# Patient Record
Sex: FEMALE | Race: WHITE
[De-identification: names, ages, dates, MRNs, and addresses within clinical notes are randomized per-mention and may not be internally consistent; named-entity substitution may affect disease eponyms.]

---

## 2023-07-26 ENCOUNTER — HOSPITAL ENCOUNTER (EMERGENCY)
Dept: HOSPITAL 26 - MED | Age: 18
Discharge: HOME | End: 2023-07-26
Payer: MEDICAID

## 2023-07-26 VITALS
OXYGEN SATURATION: 98 % | RESPIRATION RATE: 17 BRPM | DIASTOLIC BLOOD PRESSURE: 70 MMHG | HEART RATE: 74 BPM | SYSTOLIC BLOOD PRESSURE: 115 MMHG

## 2023-07-26 VITALS
RESPIRATION RATE: 18 BRPM | OXYGEN SATURATION: 98 % | TEMPERATURE: 97.2 F | SYSTOLIC BLOOD PRESSURE: 108 MMHG | DIASTOLIC BLOOD PRESSURE: 63 MMHG | HEART RATE: 81 BPM

## 2023-07-26 VITALS — HEIGHT: 61.5 IN | BODY MASS INDEX: 21.25 KG/M2 | WEIGHT: 114 LBS

## 2023-07-26 DIAGNOSIS — O23.91: ICD-10-CM

## 2023-07-26 DIAGNOSIS — R82.71: ICD-10-CM

## 2023-07-26 DIAGNOSIS — Z79.2: ICD-10-CM

## 2023-07-26 DIAGNOSIS — Z3A.01: ICD-10-CM

## 2023-07-26 DIAGNOSIS — O02.1: Primary | ICD-10-CM

## 2023-07-26 LAB
APPEARANCE UR: CLEAR
BASOPHILS # BLD AUTO: 0 K/UL (ref 0–0.22)
BASOPHILS NFR BLD AUTO: 0.5 % (ref 0–2)
BILIRUB UR QL STRIP: NEGATIVE
COLOR UR: YELLOW
EOSINOPHIL # BLD AUTO: 0.1 K/UL (ref 0–0.4)
EOSINOPHIL NFR BLD AUTO: 0.8 % (ref 0–4)
ERYTHROCYTE [DISTWIDTH] IN BLOOD BY AUTOMATED COUNT: 13.1 % (ref 11.6–13.7)
GLUCOSE UR STRIP-MCNC: NEGATIVE MG/DL
HCT VFR BLD AUTO: 36.8 % (ref 36–48)
HGB BLD-MCNC: 12.5 G/DL (ref 12–16)
HGB UR QL STRIP: (no result)
LEUKOCYTE ESTERASE UR QL STRIP: NEGATIVE
LYMPHOCYTES # BLD AUTO: 1.6 K/UL (ref 2.5–16.5)
LYMPHOCYTES NFR BLD AUTO: 22.1 % (ref 20.5–51.1)
MCH RBC QN AUTO: 30 PG (ref 27–31)
MCHC RBC AUTO-ENTMCNC: 34 G/DL (ref 33–37)
MCV RBC AUTO: 87.9 FL (ref 80–94)
MONOCYTES # BLD AUTO: 0.5 K/UL (ref 0.8–1)
MONOCYTES NFR BLD AUTO: 7.2 % (ref 1.7–9.3)
NEUTROPHILS # BLD AUTO: 5.2 K/UL (ref 1.8–7.7)
NEUTROPHILS NFR BLD AUTO: 69.4 % (ref 42.2–75.2)
NITRITE UR QL STRIP: NEGATIVE
PH UR STRIP: 8.5 [PH] (ref 5–9)
PLATELET # BLD AUTO: 195 K/UL (ref 140–450)
RBC # BLD AUTO: 4.19 MIL/UL (ref 4.2–5.4)
RBC #/AREA URNS HPF: (no result) /HPF (ref 0–5)
WBC # BLD AUTO: 7.4 K/UL (ref 4.5–11)

## 2023-07-27 ENCOUNTER — HOSPITAL ENCOUNTER (EMERGENCY)
Dept: HOSPITAL 26 - MED | Age: 18
Discharge: HOME | End: 2023-07-27
Payer: MEDICAID

## 2023-07-27 VITALS
RESPIRATION RATE: 16 BRPM | HEART RATE: 92 BPM | DIASTOLIC BLOOD PRESSURE: 70 MMHG | TEMPERATURE: 97.8 F | OXYGEN SATURATION: 99 % | SYSTOLIC BLOOD PRESSURE: 117 MMHG

## 2023-07-27 VITALS
SYSTOLIC BLOOD PRESSURE: 117 MMHG | RESPIRATION RATE: 16 BRPM | OXYGEN SATURATION: 99 % | TEMPERATURE: 97.8 F | HEART RATE: 92 BPM | DIASTOLIC BLOOD PRESSURE: 70 MMHG

## 2023-07-27 VITALS — BODY MASS INDEX: 22.19 KG/M2 | WEIGHT: 113 LBS | HEIGHT: 60 IN

## 2023-07-27 DIAGNOSIS — Z3A.01: ICD-10-CM

## 2023-07-27 DIAGNOSIS — Z79.2: ICD-10-CM

## 2023-07-27 DIAGNOSIS — Z79.899: ICD-10-CM

## 2023-07-27 DIAGNOSIS — O03.9: Primary | ICD-10-CM

## 2023-07-27 LAB
BASOPHILS # BLD AUTO: 0 K/UL (ref 0–0.22)
BASOPHILS NFR BLD AUTO: 0.4 % (ref 0–2)
EOSINOPHIL # BLD AUTO: 0.1 K/UL (ref 0–0.4)
EOSINOPHIL NFR BLD AUTO: 1.2 % (ref 0–4)
ERYTHROCYTE [DISTWIDTH] IN BLOOD BY AUTOMATED COUNT: 13 % (ref 11.6–13.7)
HCT VFR BLD AUTO: 38.5 % (ref 36–48)
HGB BLD-MCNC: 12.9 G/DL (ref 12–16)
LYMPHOCYTES # BLD AUTO: 3.1 K/UL (ref 2.5–16.5)
LYMPHOCYTES NFR BLD AUTO: 32.5 % (ref 20.5–51.1)
MCH RBC QN AUTO: 30 PG (ref 27–31)
MCHC RBC AUTO-ENTMCNC: 33 G/DL (ref 33–37)
MCV RBC AUTO: 88.9 FL (ref 80–94)
MONOCYTES # BLD AUTO: 0.8 K/UL (ref 0.8–1)
MONOCYTES NFR BLD AUTO: 8.3 % (ref 1.7–9.3)
NEUTROPHILS # BLD AUTO: 5.5 K/UL (ref 1.8–7.7)
NEUTROPHILS NFR BLD AUTO: 57.6 % (ref 42.2–75.2)
PLATELET # BLD AUTO: 208 K/UL (ref 140–450)
RBC # BLD AUTO: 4.33 MIL/UL (ref 4.2–5.4)
WBC # BLD AUTO: 9.5 K/UL (ref 4.5–11)

## 2023-07-27 NOTE — NUR
Patient discharged with v/s stable. Written and verbal after care instructions 
FOR MISSCARRIAGE given and explained. 

Patient alert, oriented and verbalized understanding of instructions. 
Ambulatory with steady gait. All questions addressed prior to discharge. ID 
band removed. Patient advised to follow up with PMD. Rx of TYLENOL XTRA 
STRENGTH given.  Opportunity to ask questions provided and answered.





COPY OF LABS PROVIDED

## 2023-07-28 ENCOUNTER — HOSPITAL ENCOUNTER (INPATIENT)
Dept: HOSPITAL 26 - MED | Age: 18
LOS: 3 days | Discharge: HOME | DRG: 779 | End: 2023-07-31
Attending: STUDENT IN AN ORGANIZED HEALTH CARE EDUCATION/TRAINING PROGRAM | Admitting: STUDENT IN AN ORGANIZED HEALTH CARE EDUCATION/TRAINING PROGRAM
Payer: SELF-PAY

## 2023-07-28 VITALS
HEART RATE: 67 BPM | SYSTOLIC BLOOD PRESSURE: 94 MMHG | OXYGEN SATURATION: 100 % | RESPIRATION RATE: 16 BRPM | DIASTOLIC BLOOD PRESSURE: 50 MMHG | TEMPERATURE: 97 F

## 2023-07-28 VITALS — WEIGHT: 170 LBS | HEIGHT: 58 IN | BODY MASS INDEX: 35.68 KG/M2

## 2023-07-28 DIAGNOSIS — Z79.899: ICD-10-CM

## 2023-07-28 DIAGNOSIS — E83.42: ICD-10-CM

## 2023-07-28 DIAGNOSIS — E87.6: ICD-10-CM

## 2023-07-28 DIAGNOSIS — D72.829: ICD-10-CM

## 2023-07-28 DIAGNOSIS — Z79.1: ICD-10-CM

## 2023-07-28 DIAGNOSIS — R65.10: ICD-10-CM

## 2023-07-28 DIAGNOSIS — E83.51: ICD-10-CM

## 2023-07-28 DIAGNOSIS — D62: ICD-10-CM

## 2023-07-28 DIAGNOSIS — O03.89: Primary | ICD-10-CM

## 2023-07-28 LAB
ALBUMIN FLD-MCNC: 3.7 G/DL (ref 3.4–5)
ANION GAP SERPL CALCULATED.3IONS-SCNC: 16.3 MMOL/L (ref 8–16)
AST SERPL-CCNC: 21 U/L (ref 15–37)
BILIRUB SERPL-MCNC: 0.4 MG/DL (ref 0–1)
BUN SERPL-MCNC: 8 MG/DL (ref 7–18)
CHLORIDE SERPL-SCNC: 106 MMOL/L (ref 98–107)
CO2 SERPL-SCNC: 21.4 MMOL/L (ref 21–32)
CREAT SERPL-MCNC: 0.7 MG/DL (ref 0.6–1.3)
ERYTHROCYTE [DISTWIDTH] IN BLOOD BY AUTOMATED COUNT: 12.9 % (ref 11.6–13.7)
GFR SERPL CREATININE-BSD FRML MDRD: 137 ML/MIN (ref 90–?)
GLUCOSE SERPL-MCNC: 173 MG/DL (ref 74–106)
HCT VFR BLD AUTO: 33.8 % (ref 36–48)
HGB BLD-MCNC: 11.3 G/DL (ref 12–16)
LYMPHOCYTES NFR BLD MANUAL: 4 % (ref 20–46)
MCH RBC QN AUTO: 30 PG (ref 27–31)
MCHC RBC AUTO-ENTMCNC: 33 G/DL (ref 33–37)
MCV RBC AUTO: 89 FL (ref 80–94)
MONOCYTES NFR BLD MANUAL: 1 % (ref 5–12)
PLATELET # BLD AUTO: 227 K/UL (ref 140–450)
POTASSIUM SERPL-SCNC: 3.7 MMOL/L (ref 3.5–5.1)
RBC # BLD AUTO: 3.79 MIL/UL (ref 4.2–5.4)
SODIUM SERPL-SCNC: 140 MMOL/L (ref 136–145)
WBC # BLD AUTO: 17 K/UL (ref 4.5–11)

## 2023-07-28 PROCEDURE — P9016 RBC LEUKOCYTES REDUCED: HCPCS

## 2023-07-28 NOTE — NUR
confirmed order of morphine sulphate 4mg to dr. valentine. dr. valentine verbalized to 
give slow push iv.

## 2023-07-28 NOTE — NUR
17YO F CC OF VAGINAL BLEEDING  TODAY ASSOCIATED WITH LOWER ABDOMINAL PAIN. 
REPORTS 8 WEEKS PREGNANT. . DENIES TRAUMA, N/V/D, PMHX, ALLERGY, FEVER. 

pt resting on bed, a/ox4, not in distress. on monitor, call light oriented and 
within reach. bed locked in lowest position. side rails x2 for safety

## 2023-07-29 VITALS — HEART RATE: 89 BPM

## 2023-07-29 VITALS
SYSTOLIC BLOOD PRESSURE: 107 MMHG | OXYGEN SATURATION: 100 % | TEMPERATURE: 97 F | DIASTOLIC BLOOD PRESSURE: 55 MMHG | HEART RATE: 98 BPM | RESPIRATION RATE: 18 BRPM

## 2023-07-29 VITALS
OXYGEN SATURATION: 100 % | DIASTOLIC BLOOD PRESSURE: 44 MMHG | HEART RATE: 94 BPM | TEMPERATURE: 96.7 F | RESPIRATION RATE: 18 BRPM | SYSTOLIC BLOOD PRESSURE: 98 MMHG

## 2023-07-29 VITALS
TEMPERATURE: 98.9 F | OXYGEN SATURATION: 100 % | RESPIRATION RATE: 18 BRPM | HEART RATE: 106 BPM | DIASTOLIC BLOOD PRESSURE: 40 MMHG | SYSTOLIC BLOOD PRESSURE: 100 MMHG

## 2023-07-29 VITALS — RESPIRATION RATE: 18 BRPM | OXYGEN SATURATION: 100 % | HEART RATE: 98 BPM

## 2023-07-29 VITALS — OXYGEN SATURATION: 98 %

## 2023-07-29 LAB
BASOPHILS # BLD AUTO: 0 K/UL (ref 0–0.22)
BASOPHILS NFR BLD AUTO: 0.1 % (ref 0–2)
EOSINOPHIL # BLD AUTO: 0 K/UL (ref 0–0.4)
EOSINOPHIL NFR BLD AUTO: 0 % (ref 0–4)
ERYTHROCYTE [DISTWIDTH] IN BLOOD BY AUTOMATED COUNT: 13.2 % (ref 11.6–13.7)
HCT VFR BLD AUTO: 17.6 % (ref 36–48)
HCT VFR BLD AUTO: 21.5 % (ref 36–48)
HCT VFR BLD AUTO: 22.9 % (ref 36–48)
HGB BLD-MCNC: 6 G/DL (ref 12–16)
HGB BLD-MCNC: 7.3 G/DL (ref 12–16)
HGB BLD-MCNC: 7.9 G/DL (ref 12–16)
LYMPHOCYTES # BLD AUTO: 1.2 K/UL (ref 2.5–16.5)
LYMPHOCYTES NFR BLD AUTO: 7.8 % (ref 20.5–51.1)
MCH RBC QN AUTO: 30 PG (ref 27–31)
MCHC RBC AUTO-ENTMCNC: 34 G/DL (ref 33–37)
MCV RBC AUTO: 88.5 FL (ref 80–94)
MONOCYTES # BLD AUTO: 0.5 K/UL (ref 0.8–1)
MONOCYTES NFR BLD AUTO: 3.5 % (ref 1.7–9.3)
NEUTROPHILS # BLD AUTO: 13.5 K/UL (ref 1.8–7.7)
NEUTROPHILS NFR BLD AUTO: 88.6 % (ref 42.2–75.2)
PLATELET # BLD AUTO: 190 K/UL (ref 140–450)
RBC # BLD AUTO: 2.43 MIL/UL (ref 4.2–5.4)
WBC # BLD AUTO: 15.3 K/UL (ref 4.5–11)

## 2023-07-29 PROCEDURE — 30233N1 TRANSFUSION OF NONAUTOLOGOUS RED BLOOD CELLS INTO PERIPHERAL VEIN, PERCUTANEOUS APPROACH: ICD-10-PCS | Performed by: STUDENT IN AN ORGANIZED HEALTH CARE EDUCATION/TRAINING PROGRAM

## 2023-07-29 RX ADMIN — SODIUM CHLORIDE SCH MLS/HR: 9 INJECTION, SOLUTION INTRAVENOUS at 08:29

## 2023-07-29 RX ADMIN — SODIUM CHLORIDE SCH MLS/HR: 9 INJECTION, SOLUTION INTRAVENOUS at 17:12

## 2023-07-29 NOTE — NUR
BLOOD FINISHED. OBTAINED VITALS FROM PATIENT. NO SIGN OF ADVERSE REACTION FROM BLOOD 
TRANSFUSION. Y-TUBING WAS REMOVED AND PROPERLY DISPOSED OF IN BIO-HAZARD BAG. IV WAS FLUSHED 
WITH NS AND PATIENT WAS HOOKED UP TO IV LINE WITH NS RUNNING . PATIENT IS AWAKE LYING 
IN SEMI-FOWLERS POSITION IN BED. WILL CONTINUE TO OBSERVE PATIENT.

## 2023-07-29 NOTE — NUR
Pt report given to kathy vu. kathy lvn verbalized understanding and no 
further question. Transfer of care at this time.

## 2023-07-29 NOTE — NUR
us done result showed possibly intrauterine gestational sac along the cervical canal 

size of sac about 8 weeks pregnancy .

## 2023-07-29 NOTE — NUR
PATIENT RECEIVED FROM ER BY ONE OF [facility] VIA PRIMO , CATHY/ROSA , VSS , MONITOR APPLIED 

SKIN INTACT , WITH IV RIGHT AC INTACT 20GAGE , SAFETY PROACTION ON PLACE , SIDE RAILS UP X2 
, BED IN LOWER POSITION 

CALL LIGHT WITHIN REACH , VAGINAL BLEEDING MILD , PT STILL UNDER OBSERVE .

## 2023-07-29 NOTE — NUR
RECEIVED REPORT FROM DAY SHIFT NURSE HOWADAY FOR CONTINUITY OF CARE. PATIENT IS A&O X4, 
Slovenian SPEAKING. PATIENT IS ON ROOM AIR, BREATHING IS NORMAL WITH SYMMETRICAL RISE AND FALL 
OF CHEST. IV IS A 20G RAC; RUNNING BLOOD. PATIENT IS AWAKE, LYING IN SEMI-FOWLERS POSITION. 
BED IS IN LOWEST POSITION, WHEELS LOCKED, CALL LIGHT IN PLACE. WILL CONTINUE TO OBSERVE 
PATIENT.

## 2023-07-29 NOTE — NUR
pt is pale and very weak with unsteady gait. pt reportsnof dizziness. checked 
bp 90/49. informed dr. wallace. dr. wallace seen the pt and will repeat cbc. 
transfered pt to bed 4.

## 2023-07-29 NOTE — NUR
pt resting on bed, a/ox4, not in distress. on monitor, call light oriented and 
within reach. bed locked in lowest position. side rails x2 for safety

## 2023-07-29 NOTE — NUR
ONE UNT BLOOD TRANSFUSION STARTED , VSS BP LOW , OBSERVE CLOSELY FIRST 15MINTS , NO REACTION 
, PT STILL UNDER OBSERVE .

## 2023-07-29 NOTE — NUR
vomiting noted, informed dr. valentine. dr. valentine evaluated the pt and verbalized to 
give .9NACl 1L fast drip and zofran 4mg iv push.

## 2023-07-29 NOTE — NUR
DR ELKE ALEGRIA CALL ME TO ORDER ABDOMEN US TO MAKE SURE UTERUS EMPTY 

AND DR SAID IF UTERUS EMPTY JUST GIVE BLOOD TRANSFUSION , IF HER HGB IMPROVED SHE CAN GO 
HOME

## 2023-07-29 NOTE — NUR
LAB CALLED ME PATIENT HGB 6.0 , HCT 17.6 DR HEWITT  NOTIFIED , MASSAGE LEFT 

WAITING TO CALLED ME BACK .

## 2023-07-29 NOTE — NUR
Patient will be admitted to care of TABBY HEWITT MD. Admited to 
TELEMETRY.  Will go to [facility]. Belongings list completed.  Report to 
SHARI BLAIR.

## 2023-07-30 VITALS
DIASTOLIC BLOOD PRESSURE: 66 MMHG | SYSTOLIC BLOOD PRESSURE: 121 MMHG | HEART RATE: 104 BPM | RESPIRATION RATE: 18 BRPM | OXYGEN SATURATION: 96 % | TEMPERATURE: 97.6 F

## 2023-07-30 VITALS
HEART RATE: 118 BPM | DIASTOLIC BLOOD PRESSURE: 45 MMHG | TEMPERATURE: 98.8 F | SYSTOLIC BLOOD PRESSURE: 111 MMHG | OXYGEN SATURATION: 100 % | RESPIRATION RATE: 18 BRPM

## 2023-07-30 VITALS
TEMPERATURE: 97.2 F | HEART RATE: 113 BPM | RESPIRATION RATE: 18 BRPM | SYSTOLIC BLOOD PRESSURE: 101 MMHG | DIASTOLIC BLOOD PRESSURE: 61 MMHG | OXYGEN SATURATION: 100 %

## 2023-07-30 VITALS
DIASTOLIC BLOOD PRESSURE: 55 MMHG | HEART RATE: 98 BPM | RESPIRATION RATE: 18 BRPM | TEMPERATURE: 97 F | SYSTOLIC BLOOD PRESSURE: 93 MMHG | OXYGEN SATURATION: 100 %

## 2023-07-30 VITALS
RESPIRATION RATE: 18 BRPM | OXYGEN SATURATION: 100 % | TEMPERATURE: 98.5 F | SYSTOLIC BLOOD PRESSURE: 97 MMHG | HEART RATE: 96 BPM | DIASTOLIC BLOOD PRESSURE: 45 MMHG

## 2023-07-30 LAB
ANION GAP SERPL CALCULATED.3IONS-SCNC: 11.1 MMOL/L (ref 8–16)
BASOPHILS # BLD AUTO: 0 K/UL (ref 0–0.22)
BASOPHILS NFR BLD AUTO: 0.4 % (ref 0–2)
BUN SERPL-MCNC: 7 MG/DL (ref 7–18)
CHLORIDE SERPL-SCNC: 110 MMOL/L (ref 98–107)
CO2 SERPL-SCNC: 22.7 MMOL/L (ref 21–32)
CREAT SERPL-MCNC: 0.5 MG/DL (ref 0.6–1.3)
EOSINOPHIL # BLD AUTO: 0 K/UL (ref 0–0.4)
EOSINOPHIL NFR BLD AUTO: 0.1 % (ref 0–4)
ERYTHROCYTE [DISTWIDTH] IN BLOOD BY AUTOMATED COUNT: 15.2 % (ref 11.6–13.7)
GFR SERPL CREATININE-BSD FRML MDRD: 202 ML/MIN (ref 90–?)
GLUCOSE SERPL-MCNC: 90 MG/DL (ref 74–106)
HCT VFR BLD AUTO: 19.7 % (ref 36–48)
HCT VFR BLD AUTO: 21.9 % (ref 36–48)
HCT VFR BLD AUTO: 27.4 % (ref 36–48)
HGB BLD-MCNC: 6.8 G/DL (ref 12–16)
HGB BLD-MCNC: 7.5 G/DL (ref 12–16)
HGB BLD-MCNC: 9.3 G/DL (ref 12–16)
LYMPHOCYTES # BLD AUTO: 1.8 K/UL (ref 2.5–16.5)
LYMPHOCYTES NFR BLD AUTO: 21.3 % (ref 20.5–51.1)
MCH RBC QN AUTO: 29 PG (ref 27–31)
MCHC RBC AUTO-ENTMCNC: 34 G/DL (ref 33–37)
MCV RBC AUTO: 85.9 FL (ref 80–94)
MONOCYTES # BLD AUTO: 0.6 K/UL (ref 0.8–1)
MONOCYTES NFR BLD AUTO: 7 % (ref 1.7–9.3)
NEUTROPHILS # BLD AUTO: 6 K/UL (ref 1.8–7.7)
NEUTROPHILS NFR BLD AUTO: 71.2 % (ref 42.2–75.2)
PLATELET # BLD AUTO: 119 K/UL (ref 140–450)
POTASSIUM SERPL-SCNC: 3.8 MMOL/L (ref 3.5–5.1)
RBC # BLD AUTO: 2.56 MIL/UL (ref 4.2–5.4)
SODIUM SERPL-SCNC: 140 MMOL/L (ref 136–145)
WBC # BLD AUTO: 8.4 K/UL (ref 4.5–11)

## 2023-07-30 RX ADMIN — SODIUM CHLORIDE SCH MLS/HR: 9 INJECTION, SOLUTION INTRAVENOUS at 04:00

## 2023-07-30 RX ADMIN — SODIUM CHLORIDE SCH MLS/HR: 9 INJECTION, SOLUTION INTRAVENOUS at 14:08

## 2023-07-30 NOTE — NUR
PATIENT HAS BEEN SCREENED AND CATEGORIZED AS MODERATE NUTRITION RISK. PATIENT WILL BE SEEN 
WITHIN 3-5 DAYS OF ADMISSION.



7/29/23-8/3/23



[address]SI [address]

## 2023-07-30 NOTE — NUR
LOOKED IN ON PATIENT. PATIENT WAS SLEEPING. BREATHING WAS NORMAL WITH SYMMETRICAL RISE AND 
FALL OF CHEST. IV IS RUNNING. WILL CONTINUE TO OBSERVE PATIENT.

## 2023-07-30 NOTE — NUR
AT 1719, PATIENT'S HGB =6.8, HCT=19.7. DR. HEWITT INFORMED. AT THIS TIME NURSE RECEIVE TO 
TO TRANSFEUSE I UNIT PRBC.  PATIENT MIGHT MIGHT DISCHARGE HOME TOMORROW.  WILL CONTINUE TO 
MONITOR.

-------------------------------------------------------------------------------

Addendum: 07/30/23 at 1928 by Silvia Burch RN

-------------------------------------------------------------------------------

@1820, 2 NURSES VERIFIED  BLOOD TOGETHER, AT 1845, BLOOD TRANSFUSING START WITH PATIENT'S 
VITAL (T-P-R: 98.2-102-18, BP: 94/57, O2 SAT: 100% IN RA WITH NO PAIN REPORT);

1900 VITAL (T-P-R: 98.2-97-18, BP: 94/54, O2 SAT 99% IN RA W/O PAIN REPORT);

1930 VITAL (T-P-R: 98.4-90-18, BP: 92/53, O2 % IN RA W/O PAIN REPORT)

WILL ENDORSE TO PM SHIFT NURSE TO COMPLETE BLOOD TRANSFUSION.

## 2023-07-30 NOTE — NUR
PATIENT'S BLOOD FINISHED AT 2145. IDENTIFYING LABELS WERE REMOVED FROM BLOOD BAG; BLOOD WAS 
PLACED IN BIO-HAZARD BAG AND DISPOSED OF IN APPROPRIATE BIN. POST TRANSFUSION VITALS WERE 
TAKEN. H&H ORDERED FOR 2345. PATIENT IS AWAKE LYING SEMI-FOWLERS IN BED. WILL CONTINUE TO 
OBSERVE PATIENT.

## 2023-07-30 NOTE — NUR
LOOKED IN ON PATIENT. PATIENT WAS SLEEPING. BREATHING WAS NORMAL WITH SYMMETRICAL RISE AND 
FALL OF CHEST. WILL CONTINUE TO OBSERVE PATIENT.

## 2023-07-30 NOTE — NUR
RECEIVED REPORT FROM DAY SHIFT NURSE VERONICA FOR CONTINUITY OF CARE. PATIENT IS A&O X4, 
Lithuanian SPEAKING. PATIENT IS ON ROOM AIR, BREATHING IS NORMAL WITH SYMMETRICAL RISE AND FALL 
OF CHEST. IV IS A 20G RAC; RUNNING BLOOD. PATIENT IS AWAKE, LYING IN SEMI-FOWLERS POSITION. 
BED IS IN LOWEST POSITION, WHEELS LOCKED, CALL LIGHT IN PLACE. WILL CONTINUE TO OBSERVE 
PATIENT.

## 2023-07-31 VITALS
HEART RATE: 95 BPM | SYSTOLIC BLOOD PRESSURE: 98 MMHG | RESPIRATION RATE: 18 BRPM | DIASTOLIC BLOOD PRESSURE: 49 MMHG | TEMPERATURE: 97.4 F

## 2023-07-31 VITALS
HEART RATE: 95 BPM | DIASTOLIC BLOOD PRESSURE: 49 MMHG | SYSTOLIC BLOOD PRESSURE: 98 MMHG | TEMPERATURE: 97.4 F | OXYGEN SATURATION: 100 % | RESPIRATION RATE: 18 BRPM

## 2023-07-31 VITALS
TEMPERATURE: 96.7 F | SYSTOLIC BLOOD PRESSURE: 104 MMHG | HEART RATE: 97 BPM | RESPIRATION RATE: 18 BRPM | OXYGEN SATURATION: 100 % | DIASTOLIC BLOOD PRESSURE: 58 MMHG

## 2023-07-31 LAB
ANION GAP SERPL CALCULATED.3IONS-SCNC: 16.3 MMOL/L (ref 8–16)
BASOPHILS # BLD AUTO: 0 K/UL (ref 0–0.22)
BASOPHILS NFR BLD AUTO: 0.5 % (ref 0–2)
BUN SERPL-MCNC: 7 MG/DL (ref 7–18)
CHLORIDE SERPL-SCNC: 108 MMOL/L (ref 98–107)
CO2 SERPL-SCNC: 18 MMOL/L (ref 21–32)
CREAT SERPL-MCNC: 0.5 MG/DL (ref 0.6–1.3)
EOSINOPHIL # BLD AUTO: 0 K/UL (ref 0–0.4)
EOSINOPHIL NFR BLD AUTO: 0.2 % (ref 0–4)
ERYTHROCYTE [DISTWIDTH] IN BLOOD BY AUTOMATED COUNT: 14.9 % (ref 11.6–13.7)
GFR SERPL CREATININE-BSD FRML MDRD: 202 ML/MIN (ref 90–?)
GLUCOSE SERPL-MCNC: 69 MG/DL (ref 74–106)
HCT VFR BLD AUTO: 26.1 % (ref 36–48)
HGB BLD-MCNC: 9.1 G/DL (ref 12–16)
LYMPHOCYTES # BLD AUTO: 1.2 K/UL (ref 2.5–16.5)
LYMPHOCYTES NFR BLD AUTO: 23.5 % (ref 20.5–51.1)
MCH RBC QN AUTO: 30 PG (ref 27–31)
MCHC RBC AUTO-ENTMCNC: 35 G/DL (ref 33–37)
MCV RBC AUTO: 84.9 FL (ref 80–94)
MONOCYTES # BLD AUTO: 0.5 K/UL (ref 0.8–1)
MONOCYTES NFR BLD AUTO: 9.5 % (ref 1.7–9.3)
NEUTROPHILS # BLD AUTO: 3.3 K/UL (ref 1.8–7.7)
NEUTROPHILS NFR BLD AUTO: 66.3 % (ref 42.2–75.2)
PLATELET # BLD AUTO: 115 K/UL (ref 140–450)
POTASSIUM SERPL-SCNC: 3.3 MMOL/L (ref 3.5–5.1)
RBC # BLD AUTO: 3.08 MIL/UL (ref 4.2–5.4)
SODIUM SERPL-SCNC: 139 MMOL/L (ref 136–145)
WBC # BLD AUTO: 5 K/UL (ref 4.5–11)

## 2023-07-31 RX ADMIN — SODIUM CHLORIDE SCH MLS/HR: 9 INJECTION, SOLUTION INTRAVENOUS at 05:49

## 2023-07-31 RX ADMIN — SODIUM CHLORIDE SCH MLS/HR: 9 INJECTION, SOLUTION INTRAVENOUS at 00:00

## 2023-07-31 NOTE — NUR
DISCHARGE PATINE IN STABLE CONDITION TO HOME PER PCP ORDER. DISCHARGE INSTRUCTION GIVE VIA 
[employer]  #[id number]. PATIENT AWARE THAT SHE NEED CALL PHARMACY FOR 
PRESCRIPTION.  DISCHARGE CONSENT SIGN, IV ACCES & WRIST BAND REMOVE PRIOR WALK PATIENT OUT 
THE FACILITY

## 2023-07-31 NOTE — NUR
LOOKED IN ON PATIENT. PATIENT HAS SLEPT THROUGHOUT THE NIGHT. BREATHING IS NORMAL WITH 
SYMMETRICAL RISE  AND FALL OF CHEST. WILL CONTINUE TO OBSERVE PATIENT.

## 2023-07-31 NOTE — NUR
LOOKED IN ON PATIENT. PATIENT WAS SLEEPING; BREATHING WAS NORMAL WITH SYMMETRICAL RISE AND 
FALL OF CHEST. IV IS RUNNING NS . WILL CONTINUE TO OBSERVE PATIENT.